# Patient Record
Sex: FEMALE | Race: WHITE | HISPANIC OR LATINO | Employment: OTHER | URBAN - METROPOLITAN AREA
[De-identification: names, ages, dates, MRNs, and addresses within clinical notes are randomized per-mention and may not be internally consistent; named-entity substitution may affect disease eponyms.]

---

## 2024-04-19 ENCOUNTER — APPOINTMENT (OUTPATIENT)
Dept: NEUROLOGY | Facility: CLINIC | Age: 74
End: 2024-04-19
Payer: COMMERCIAL

## 2024-07-19 PROBLEM — E78.5 HYPERLIPIDEMIA: Status: ACTIVE | Noted: 2024-07-19

## 2024-07-24 ENCOUNTER — APPOINTMENT (OUTPATIENT)
Dept: NEUROLOGY | Facility: CLINIC | Age: 74
End: 2024-07-24
Payer: COMMERCIAL

## 2024-07-24 VITALS
HEIGHT: 65 IN | BODY MASS INDEX: 24.19 KG/M2 | TEMPERATURE: 97.3 F | SYSTOLIC BLOOD PRESSURE: 160 MMHG | DIASTOLIC BLOOD PRESSURE: 83 MMHG | HEART RATE: 76 BPM | WEIGHT: 145.2 LBS

## 2024-07-24 DIAGNOSIS — R52 PAIN: ICD-10-CM

## 2024-07-24 PROCEDURE — 99204 OFFICE O/P NEW MOD 45 MIN: CPT | Performed by: PSYCHIATRY & NEUROLOGY

## 2024-07-24 PROCEDURE — 1126F AMNT PAIN NOTED NONE PRSNT: CPT | Performed by: PSYCHIATRY & NEUROLOGY

## 2024-07-24 PROCEDURE — 1036F TOBACCO NON-USER: CPT | Performed by: PSYCHIATRY & NEUROLOGY

## 2024-07-24 PROCEDURE — 3008F BODY MASS INDEX DOCD: CPT | Performed by: PSYCHIATRY & NEUROLOGY

## 2024-07-24 PROCEDURE — 1159F MED LIST DOCD IN RCRD: CPT | Performed by: PSYCHIATRY & NEUROLOGY

## 2024-07-24 RX ORDER — LANOLIN ALCOHOL/MO/W.PET/CERES
1000 CREAM (GRAM) TOPICAL DAILY
COMMUNITY

## 2024-07-24 RX ORDER — BISMUTH SUBSALICYLATE 262 MG
1 TABLET,CHEWABLE ORAL DAILY
COMMUNITY

## 2024-07-24 ASSESSMENT — ENCOUNTER SYMPTOMS
PAIN: 1
NUMBNESS: 0
HEADACHES: 1

## 2024-07-24 ASSESSMENT — PATIENT HEALTH QUESTIONNAIRE - PHQ9
1. LITTLE INTEREST OR PLEASURE IN DOING THINGS: NOT AT ALL
2. FEELING DOWN, DEPRESSED OR HOPELESS: NOT AT ALL
SUM OF ALL RESPONSES TO PHQ9 QUESTIONS 1 AND 2: 0

## 2024-07-24 ASSESSMENT — PAIN SCALES - GENERAL: PAINLEVEL: 0-NO PAIN

## 2024-07-24 NOTE — PROGRESS NOTES
"Subjective     Raysa Parsons is a right handed  73 y.o. year old female who presents with Pain (NPV; rmd- urgent care/ [facility]AKIS[facility] Demetro. /(R) sided jaw pain).    Visit type: new patient visit    Pain  Associated symptoms include headaches.        She was having severe jaw pain.  She went to her dentist and found no problem.   She came to urgent care and saw a PA-KELVIN who thought it was sinus drainage and put her on prednisone and antibiotic. IT took about one week and the pain improved.    It was so bad she could not sleep on her right side and eating was very painful.      She now if she bites she feels a little sensation. She does not have to take any medication for it.  If she puts her tongue in the center of the roof of the mouth then she gets a \"tickle\" in the right cheek.     The pain felt like severe tooth pain.  It was an ache. There was no electric pain with it.  The pain was constant.  There were not definite triggers.      Now its more like a funny sensation on that side.  That brought on by biting. Its very minor.      She gets a mild nagging pain in the right side of the headache sometimes with the face pain. It goes away fairly quickly.        Review of Systems   Neurological:  Positive for headaches. Negative for numbness.   All other systems reviewed and are negative.    All other systems have been reviewed and are negative for complaint.     No past medical history on file.  No family history on file.  No past surgical history on file.   Social History     Tobacco Use    Smoking status: Never    Smokeless tobacco: Never   Substance Use Topics    Alcohol use: Yes          Objective     Neurological Exam    Physical Exam    On general examination, the patient is well appearing and well groomed. Heart is regular, rate and rhythm. Lungs are clear to ausculation bilaterally. There is no peripheral edema. Normal pedal pulses bilaterally. No carotid bruits.   On neurologic examination, the mental status is " unremarkable to informal testing. The history is related in quite good detail with no obvious deficit of attention, memory or language. Fund of knowledge is adequate. Orientation is intact to person, place and time. Spontaneous speech is fluent with no paraphasic or aphasic errors. On cranial nerve exam, the pupils are equal, round and reactive to light. Extraocular movements are full, without nystagmus. She reports no double vision on sustained upgaze or lateral gaze. Visual fields are full to confrontation. The fundi are benign with normal sharp disc margins. There is no bulbofacial weakness or ptosis. There is no ptosis with sustained upgaze. The tongue is midline with no wasting or fasciculations. The palate elevates symmetrically. Facial sensation is intact to light touch and pinprick bilaterally. Hearing is intact to finger rub bilaterally. Shoulder shrug is 5/5 bilaterally.  Neck flexion and extension have full strength. Motor examination reveals normal tone and bulk in the upper and lower extremities bilaterally. There are no fasciculations. There is full strength in the proximal and distal muscles of the upper and lower extremities bilaterally. Deep tendon reflexes are 2/4 and symmetric throughout the upper and lower extremities bilaterally, including the ankle jerks. Plantar responses are flexor bilaterally. Fine finger movements and rapid alternating movements are done well in both hands. There is no tremor. On coordination testing, finger-nose-finger testing are done well bilaterally. Sensory examination reveals normal light touch sensation in the distal upper and lower extremities bilaterally. Gait is normal.     Assessment/Plan   Ms. Parsons is a 73 year old woman presenting for initial evaluation of face/jaw pain.  Referred for concern of trigeminal neuralgia however they do not meet the criteria for TN. Also atypical for trigeminal neuropathy. Pain responding to course of prednisone and antibiotics  however she continues to get mild symptoms. Exam is normal.  Will check ESR/CRP given jaw pain and give her a trial of alpha lipoic acid.  Consider gabapentin if no improvement.    Mohini Blair, DO

## 2024-07-24 NOTE — PATIENT INSTRUCTIONS
Recommend that you try alpha lipoic acid 600 mg daily to see if that helps with the pain. Give this about 3 weeks and if  no improvement then can try a medication to calm down the pain.

## 2024-08-08 ENCOUNTER — TELEPHONE (OUTPATIENT)
Dept: NEUROLOGY | Facility: CLINIC | Age: 74
End: 2024-08-08
Payer: COMMERCIAL